# Patient Record
Sex: FEMALE | Race: WHITE | NOT HISPANIC OR LATINO | Employment: FULL TIME | URBAN - METROPOLITAN AREA
[De-identification: names, ages, dates, MRNs, and addresses within clinical notes are randomized per-mention and may not be internally consistent; named-entity substitution may affect disease eponyms.]

---

## 2021-12-17 ENCOUNTER — APPOINTMENT (OUTPATIENT)
Dept: CT IMAGING | Facility: CLINIC | Age: 56
End: 2021-12-17
Attending: EMERGENCY MEDICINE
Payer: COMMERCIAL

## 2021-12-17 ENCOUNTER — HOSPITAL ENCOUNTER (INPATIENT)
Facility: CLINIC | Age: 56
LOS: 3 days | Discharge: HOME | End: 2021-12-20
Attending: EMERGENCY MEDICINE | Admitting: INTERNAL MEDICINE
Payer: COMMERCIAL

## 2021-12-17 DIAGNOSIS — U07.1 PNEUMONIA DUE TO 2019 NOVEL CORONAVIRUS: ICD-10-CM

## 2021-12-17 DIAGNOSIS — J96.01 ACUTE RESPIRATORY FAILURE WITH HYPOXIA (H): ICD-10-CM

## 2021-12-17 DIAGNOSIS — J12.82 PNEUMONIA DUE TO 2019 NOVEL CORONAVIRUS: ICD-10-CM

## 2021-12-17 LAB
ALBUMIN SERPL-MCNC: 3.2 G/DL (ref 3.4–5)
ALP SERPL-CCNC: 68 U/L (ref 40–150)
ALT SERPL W P-5'-P-CCNC: 121 U/L (ref 0–50)
ANION GAP SERPL CALCULATED.3IONS-SCNC: 11 MMOL/L (ref 3–14)
AST SERPL W P-5'-P-CCNC: 47 U/L (ref 0–45)
BASOPHILS # BLD AUTO: 0 10E3/UL (ref 0–0.2)
BASOPHILS NFR BLD AUTO: 0 %
BILIRUB DIRECT SERPL-MCNC: 0.2 MG/DL (ref 0–0.2)
BILIRUB SERPL-MCNC: 0.6 MG/DL (ref 0.2–1.3)
BUN SERPL-MCNC: 15 MG/DL (ref 7–30)
CALCIUM SERPL-MCNC: 9 MG/DL (ref 8.5–10.1)
CHLORIDE BLD-SCNC: 94 MMOL/L (ref 94–109)
CO2 SERPL-SCNC: 28 MMOL/L (ref 20–32)
CREAT SERPL-MCNC: 0.54 MG/DL (ref 0.52–1.04)
CRP SERPL-MCNC: 66.3 MG/L (ref 0–8)
D DIMER PPP FEU-MCNC: 0.71 UG/ML FEU (ref 0–0.5)
EOSINOPHIL # BLD AUTO: 0 10E3/UL (ref 0–0.7)
EOSINOPHIL NFR BLD AUTO: 0 %
ERYTHROCYTE [DISTWIDTH] IN BLOOD BY AUTOMATED COUNT: 12.3 % (ref 10–15)
GFR SERPL CREATININE-BSD FRML MDRD: >90 ML/MIN/1.73M2
GLUCOSE BLD-MCNC: 123 MG/DL (ref 70–99)
HCT VFR BLD AUTO: 44.3 % (ref 35–47)
HGB BLD-MCNC: 14.6 G/DL (ref 11.7–15.7)
HOLD SPECIMEN: NORMAL
IMM GRANULOCYTES # BLD: 0 10E3/UL
IMM GRANULOCYTES NFR BLD: 1 %
LYMPHOCYTES # BLD AUTO: 0.7 10E3/UL (ref 0.8–5.3)
LYMPHOCYTES NFR BLD AUTO: 12 %
MCH RBC QN AUTO: 31.6 PG (ref 26.5–33)
MCHC RBC AUTO-ENTMCNC: 33 G/DL (ref 31.5–36.5)
MCV RBC AUTO: 96 FL (ref 78–100)
MONOCYTES # BLD AUTO: 0.7 10E3/UL (ref 0–1.3)
MONOCYTES NFR BLD AUTO: 11 %
NEUTROPHILS # BLD AUTO: 4.7 10E3/UL (ref 1.6–8.3)
NEUTROPHILS NFR BLD AUTO: 76 %
NRBC # BLD AUTO: 0 10E3/UL
NRBC BLD AUTO-RTO: 0 /100
PLATELET # BLD AUTO: 334 10E3/UL (ref 150–450)
POTASSIUM BLD-SCNC: 3.5 MMOL/L (ref 3.4–5.3)
PROT SERPL-MCNC: 8 G/DL (ref 6.8–8.8)
RBC # BLD AUTO: 4.62 10E6/UL (ref 3.8–5.2)
SODIUM SERPL-SCNC: 133 MMOL/L (ref 133–144)
TROPONIN I SERPL HS-MCNC: 24 NG/L
WBC # BLD AUTO: 6.1 10E3/UL (ref 4–11)

## 2021-12-17 PROCEDURE — 99207 PR CDG-HISTORY COMP: MEETS EXP. PROB FOCUSED- DOWN CODED LACK OF PFSH: CPT | Performed by: INTERNAL MEDICINE

## 2021-12-17 PROCEDURE — 93005 ELECTROCARDIOGRAM TRACING: CPT

## 2021-12-17 PROCEDURE — 36415 COLL VENOUS BLD VENIPUNCTURE: CPT | Performed by: EMERGENCY MEDICINE

## 2021-12-17 PROCEDURE — 86140 C-REACTIVE PROTEIN: CPT | Performed by: EMERGENCY MEDICINE

## 2021-12-17 PROCEDURE — 250N000009 HC RX 250: Performed by: EMERGENCY MEDICINE

## 2021-12-17 PROCEDURE — 85025 COMPLETE CBC W/AUTO DIFF WBC: CPT | Performed by: EMERGENCY MEDICINE

## 2021-12-17 PROCEDURE — 71275 CT ANGIOGRAPHY CHEST: CPT

## 2021-12-17 PROCEDURE — 96374 THER/PROPH/DIAG INJ IV PUSH: CPT

## 2021-12-17 PROCEDURE — 250N000011 HC RX IP 250 OP 636: Performed by: EMERGENCY MEDICINE

## 2021-12-17 PROCEDURE — 84484 ASSAY OF TROPONIN QUANT: CPT | Performed by: EMERGENCY MEDICINE

## 2021-12-17 PROCEDURE — 82248 BILIRUBIN DIRECT: CPT | Performed by: EMERGENCY MEDICINE

## 2021-12-17 PROCEDURE — 120N000001 HC R&B MED SURG/OB

## 2021-12-17 PROCEDURE — XW033E5 INTRODUCTION OF REMDESIVIR ANTI-INFECTIVE INTO PERIPHERAL VEIN, PERCUTANEOUS APPROACH, NEW TECHNOLOGY GROUP 5: ICD-10-PCS | Performed by: INTERNAL MEDICINE

## 2021-12-17 PROCEDURE — 250N000013 HC RX MED GY IP 250 OP 250 PS 637: Performed by: INTERNAL MEDICINE

## 2021-12-17 PROCEDURE — 99285 EMERGENCY DEPT VISIT HI MDM: CPT | Mod: 25

## 2021-12-17 PROCEDURE — 99221 1ST HOSP IP/OBS SF/LOW 40: CPT | Mod: AI | Performed by: INTERNAL MEDICINE

## 2021-12-17 PROCEDURE — C9803 HOPD COVID-19 SPEC COLLECT: HCPCS

## 2021-12-17 PROCEDURE — 85379 FIBRIN DEGRADATION QUANT: CPT | Performed by: EMERGENCY MEDICINE

## 2021-12-17 PROCEDURE — 80048 BASIC METABOLIC PNL TOTAL CA: CPT | Performed by: EMERGENCY MEDICINE

## 2021-12-17 RX ORDER — DEXAMETHASONE SODIUM PHOSPHATE 10 MG/ML
10 INJECTION, SOLUTION INTRAMUSCULAR; INTRAVENOUS ONCE
Status: COMPLETED | OUTPATIENT
Start: 2021-12-17 | End: 2021-12-17

## 2021-12-17 RX ORDER — BENZONATATE 100 MG/1
100 CAPSULE ORAL 3 TIMES DAILY PRN
Status: DISCONTINUED | OUTPATIENT
Start: 2021-12-17 | End: 2021-12-20 | Stop reason: HOSPADM

## 2021-12-17 RX ORDER — LIDOCAINE 40 MG/G
CREAM TOPICAL
Status: DISCONTINUED | OUTPATIENT
Start: 2021-12-17 | End: 2021-12-20 | Stop reason: HOSPADM

## 2021-12-17 RX ORDER — GUAIFENESIN/DEXTROMETHORPHAN 100-10MG/5
10 SYRUP ORAL EVERY 4 HOURS PRN
Status: DISCONTINUED | OUTPATIENT
Start: 2021-12-17 | End: 2021-12-20 | Stop reason: HOSPADM

## 2021-12-17 RX ORDER — AMOXICILLIN 250 MG
2 CAPSULE ORAL 2 TIMES DAILY PRN
Status: DISCONTINUED | OUTPATIENT
Start: 2021-12-17 | End: 2021-12-20 | Stop reason: HOSPADM

## 2021-12-17 RX ORDER — ACETAMINOPHEN 650 MG/1
650 SUPPOSITORY RECTAL EVERY 6 HOURS PRN
Status: DISCONTINUED | OUTPATIENT
Start: 2021-12-17 | End: 2021-12-20 | Stop reason: HOSPADM

## 2021-12-17 RX ORDER — IOPAMIDOL 755 MG/ML
53 INJECTION, SOLUTION INTRAVASCULAR ONCE
Status: COMPLETED | OUTPATIENT
Start: 2021-12-17 | End: 2021-12-17

## 2021-12-17 RX ORDER — FAMOTIDINE 20 MG/1
20 TABLET, FILM COATED ORAL 2 TIMES DAILY
Status: DISCONTINUED | OUTPATIENT
Start: 2021-12-17 | End: 2021-12-20 | Stop reason: HOSPADM

## 2021-12-17 RX ORDER — ONDANSETRON 4 MG/1
4 TABLET, ORALLY DISINTEGRATING ORAL EVERY 6 HOURS PRN
Status: DISCONTINUED | OUTPATIENT
Start: 2021-12-17 | End: 2021-12-20 | Stop reason: HOSPADM

## 2021-12-17 RX ORDER — AMOXICILLIN 250 MG
1 CAPSULE ORAL 2 TIMES DAILY PRN
Status: DISCONTINUED | OUTPATIENT
Start: 2021-12-17 | End: 2021-12-20 | Stop reason: HOSPADM

## 2021-12-17 RX ORDER — ONDANSETRON 2 MG/ML
4 INJECTION INTRAMUSCULAR; INTRAVENOUS EVERY 6 HOURS PRN
Status: DISCONTINUED | OUTPATIENT
Start: 2021-12-17 | End: 2021-12-20 | Stop reason: HOSPADM

## 2021-12-17 RX ORDER — ACETAMINOPHEN 325 MG/1
650 TABLET ORAL EVERY 6 HOURS PRN
Status: DISCONTINUED | OUTPATIENT
Start: 2021-12-17 | End: 2021-12-20 | Stop reason: HOSPADM

## 2021-12-17 RX ORDER — NAPROXEN 500 MG/1
500 TABLET ORAL EVERY 12 HOURS PRN
Status: DISCONTINUED | OUTPATIENT
Start: 2021-12-17 | End: 2021-12-20 | Stop reason: HOSPADM

## 2021-12-17 RX ADMIN — SODIUM CHLORIDE 77 ML: 9 INJECTION, SOLUTION INTRAVENOUS at 20:13

## 2021-12-17 RX ADMIN — GUAIFENESIN AND DEXTROMETHORPHAN 10 ML: 100; 10 SYRUP ORAL at 23:53

## 2021-12-17 RX ADMIN — FAMOTIDINE 20 MG: 20 TABLET ORAL at 23:52

## 2021-12-17 RX ADMIN — DEXAMETHASONE SODIUM PHOSPHATE 10 MG: 10 INJECTION INTRAMUSCULAR; INTRAVENOUS at 21:47

## 2021-12-17 RX ADMIN — IOPAMIDOL 53 ML: 755 INJECTION, SOLUTION INTRAVENOUS at 20:13

## 2021-12-17 ASSESSMENT — ENCOUNTER SYMPTOMS
SHORTNESS OF BREATH: 1
NAUSEA: 0
VOMITING: 0
COUGH: 1
DIARRHEA: 1
CHILLS: 1
APPETITE CHANGE: 1
FEVER: 1

## 2021-12-17 ASSESSMENT — MIFFLIN-ST. JEOR: SCORE: 1108.43

## 2021-12-17 ASSESSMENT — ACTIVITIES OF DAILY LIVING (ADL)
ADLS_ACUITY_SCORE: 12
ADLS_ACUITY_SCORE: 12

## 2021-12-17 NOTE — ED TRIAGE NOTES
Pt tested + for COVID 10 days ago. She reports she was seen in clinic on Wednesday for continuing SOB and diagnosed w/ pneumonia and given a z-pack and prednisone. Pt has ongoing SOB. ABCs intact.

## 2021-12-18 LAB
ALBUMIN SERPL-MCNC: 3 G/DL (ref 3.4–5)
ALP SERPL-CCNC: 67 U/L (ref 40–150)
ALT SERPL W P-5'-P-CCNC: 107 U/L (ref 0–50)
ANION GAP SERPL CALCULATED.3IONS-SCNC: 9 MMOL/L (ref 3–14)
AST SERPL W P-5'-P-CCNC: 35 U/L (ref 0–45)
BILIRUB SERPL-MCNC: 0.6 MG/DL (ref 0.2–1.3)
BUN SERPL-MCNC: 18 MG/DL (ref 7–30)
CALCIUM SERPL-MCNC: 8.9 MG/DL (ref 8.5–10.1)
CHLORIDE BLD-SCNC: 97 MMOL/L (ref 94–109)
CO2 SERPL-SCNC: 27 MMOL/L (ref 20–32)
CREAT SERPL-MCNC: 0.59 MG/DL (ref 0.52–1.04)
CRP SERPL-MCNC: 49.5 MG/L (ref 0–8)
D DIMER PPP FEU-MCNC: 0.44 UG/ML FEU (ref 0–0.5)
GFR SERPL CREATININE-BSD FRML MDRD: >90 ML/MIN/1.73M2
GLUCOSE BLD-MCNC: 183 MG/DL (ref 70–99)
POTASSIUM BLD-SCNC: 3.7 MMOL/L (ref 3.4–5.3)
PROT SERPL-MCNC: 7.3 G/DL (ref 6.8–8.8)
SODIUM SERPL-SCNC: 133 MMOL/L (ref 133–144)

## 2021-12-18 PROCEDURE — 80053 COMPREHEN METABOLIC PANEL: CPT | Performed by: INTERNAL MEDICINE

## 2021-12-18 PROCEDURE — 36415 COLL VENOUS BLD VENIPUNCTURE: CPT | Performed by: INTERNAL MEDICINE

## 2021-12-18 PROCEDURE — 250N000011 HC RX IP 250 OP 636: Performed by: INTERNAL MEDICINE

## 2021-12-18 PROCEDURE — 85379 FIBRIN DEGRADATION QUANT: CPT | Performed by: INTERNAL MEDICINE

## 2021-12-18 PROCEDURE — 120N000001 HC R&B MED SURG/OB

## 2021-12-18 PROCEDURE — 86140 C-REACTIVE PROTEIN: CPT | Performed by: INTERNAL MEDICINE

## 2021-12-18 PROCEDURE — 99233 SBSQ HOSP IP/OBS HIGH 50: CPT | Performed by: HOSPITALIST

## 2021-12-18 PROCEDURE — 250N000012 HC RX MED GY IP 250 OP 636 PS 637: Performed by: INTERNAL MEDICINE

## 2021-12-18 PROCEDURE — 250N000013 HC RX MED GY IP 250 OP 250 PS 637: Performed by: INTERNAL MEDICINE

## 2021-12-18 RX ORDER — LOPERAMIDE HCL 2 MG
2 CAPSULE ORAL 4 TIMES DAILY PRN
Status: DISCONTINUED | OUTPATIENT
Start: 2021-12-18 | End: 2021-12-20 | Stop reason: HOSPADM

## 2021-12-18 RX ORDER — ALBUTEROL SULFATE 90 UG/1
1-2 AEROSOL, METERED RESPIRATORY (INHALATION) EVERY 4 HOURS PRN
COMMUNITY
Start: 2021-12-15

## 2021-12-18 RX ORDER — ALBUTEROL SULFATE 90 UG/1
2 AEROSOL, METERED RESPIRATORY (INHALATION) EVERY 6 HOURS PRN
Status: DISCONTINUED | OUTPATIENT
Start: 2021-12-18 | End: 2021-12-20 | Stop reason: HOSPADM

## 2021-12-18 RX ORDER — AZITHROMYCIN 250 MG/1
TABLET, FILM COATED ORAL
Status: ON HOLD | COMMUNITY
Start: 2021-12-15 | End: 2021-12-20

## 2021-12-18 RX ORDER — PREDNISONE 10 MG/1
TABLET ORAL
Status: ON HOLD | COMMUNITY
Start: 2021-12-15 | End: 2021-12-20

## 2021-12-18 RX ADMIN — ACETAMINOPHEN 650 MG: 325 TABLET, FILM COATED ORAL at 09:15

## 2021-12-18 RX ADMIN — FAMOTIDINE 20 MG: 20 TABLET ORAL at 09:15

## 2021-12-18 RX ADMIN — DEXAMETHASONE 6 MG: 2 TABLET ORAL at 09:15

## 2021-12-18 RX ADMIN — FAMOTIDINE 20 MG: 20 TABLET ORAL at 20:19

## 2021-12-18 RX ADMIN — GUAIFENESIN AND DEXTROMETHORPHAN 10 ML: 100; 10 SYRUP ORAL at 20:19

## 2021-12-18 RX ADMIN — Medication 1 MG: at 22:32

## 2021-12-18 RX ADMIN — ENOXAPARIN SODIUM 40 MG: 40 INJECTION SUBCUTANEOUS at 22:32

## 2021-12-18 RX ADMIN — GUAIFENESIN AND DEXTROMETHORPHAN 10 ML: 100; 10 SYRUP ORAL at 14:00

## 2021-12-18 RX ADMIN — GUAIFENESIN AND DEXTROMETHORPHAN 10 ML: 100; 10 SYRUP ORAL at 09:17

## 2021-12-18 RX ADMIN — ENOXAPARIN SODIUM 40 MG: 40 INJECTION SUBCUTANEOUS at 00:50

## 2021-12-18 ASSESSMENT — ACTIVITIES OF DAILY LIVING (ADL)
ADLS_ACUITY_SCORE: 4

## 2021-12-18 NOTE — PLAN OF CARE
Pt arrived to the 6th floor around 2300. Pt is alert and oriented. Denies of having any pain. LS clear but diminished in bases. Small infrequent cough with productive thin/thick yellow sputum. Pt has lost 10lbs in the 10 days that she has been suffered from this illness. GI - having difficulty making it into the bathroom - bedside commode provided. LBM 12/18/21 - loose/watery brown/yellow stool. On decadron and lovenox.

## 2021-12-18 NOTE — PROVIDER NOTIFICATION
Spoke w/ pt about remdesivir as per MD, the patient would be a candidate for it. Pt declined, RN let MD know.

## 2021-12-18 NOTE — PHARMACY-ADMISSION MEDICATION HISTORY
Admission medication history interview status for this patient is complete. See [facility] admission navigator for allergy information, prior to admission medications and immunization status.     Medication history interview done, indicate source(s): Patient  Medication history resources (including written lists, pill bottles, clinic record):SureScripts, Care Everywhere  Pharmacy: [facility] [id number] [place] [facility] [id number] [facility]    Changes made to PTA medication list:  Added: azithromycin, prednisone  Changed: none  Reported as Not Taking: none  Removed: Percocet, sertraline (old entries)    Actions taken by pharmacist (provider contacted, etc):left sticky note for provider     Additional medication history information:None    Medication reconciliation/reorder completed by provider prior to medication history?  N    Prior to Admission medications    Medication Sig Last Dose Taking? Auth Provider   albuterol (PROAIR HFA/PROVENTIL HFA/VENTOLIN HFA) 108 (90 Base) MCG/ACT inhaler Inhale 1-2 puffs into the lungs every 4 hours as needed 12/17/2021 Yes Unknown, Entered By History   azithromycin (ZITHROMAX) 250 MG tablet Take 500 mg (2 tabs) by mouth on day 1, then 250 mg (1 tab) daily for days 2-5. 12/17/2021 at AM, 250 mg Yes Unknown, Entered By History   predniSONE (DELTASONE) 10 MG tablet Take 3 tabs daily for 3 days, then take 2 tabs daily for 2 days, then 1 tab daily for 2 days; avoid taking at night 12/17/2021 at 3 tabs Yes Unknown, Entered By History

## 2021-12-18 NOTE — PLAN OF CARE
Pt A&O x4. VS stable; afebrile. Weaned to 1L O2. Lung sounds: diminished-crackles in the bases. Robitussin given for productive cough. PO tylenol managing pain. CMS intact. Up independently in room. Voiding in good amts. Still having loose stools-but has slowed down per pt. Tolerating regular diet. Plan is home @ discharge. Will continue to monitor.

## 2021-12-18 NOTE — PROGRESS NOTES
[facility]    Hospitalist Progress Note    Date of Service (when I saw the patient): 12/18/2021  Provider:  Agustín Colin MD   Text Page  7am - 6PM       Assessment & Plan   Polly Whelan is a 56 year old female who is generally healthy but not vaccinated against COVID-19 who presents with shortness of breath in the setting of recent diagnosis of COVID-19.  She was diagnosed about 10 days ago and initially she had typical symptoms including fever, chills, cough and some loose stools.  Most of her symptoms were getting better aside from her dyspnea and cough and now even walking across the room leaves her quite short of breath.  She has had generally poor appetite but has been able to drink fluids. She had been on prednisone 30 mg daily for the 3 days preceding admission, in addition to azithromycin started as an outpatient.   In  the emergency department she was mildly hypoxemic requiring 2 L supplemental oxygen. CT PE protocol negative for PE, shows bilateral GGO / infiltrates.     Assessment and Plan:   1. Acute hypoxemic respiratory failure due to COVID-19:   --inpatient status  --Unvaccinated status  --Symptoms came on somewhere around 12/5  --Positive test reportedly approximately 12/7/2021  --Decadron 6 mg daily 2/10 days  -- LFTs allow to use remdesivir but she has declined.  --Lovenox 40 mg daily for DVT prophylaxis  -- CRP and D-dimer  --Tylenol, Robitussin, naproxen, Tessalon etc.  --Pepcid for GI prophylaxis while on steroids  2. Diarrhea 2/2 to Covid.   -- Imodium for diarrhea     DVT Prophylaxis: Enoxaparin (Lovenox) SQ  Code Status: Full Code    Disposition: Expected discharge in 2 - 3 days if wean off or low O2 necessity. .    Interval History   She is having diarrhea, no fever, nausea or vomiting.  Her appetite is dissectible.  Breathing is no worse than yesterday.  She still needs oxygen to keep saturation, desats when she is moving around but recovers quickly.    -Data  reviewed today: I reviewed all new labs and imaging results over the last 24 hours.    Physical Exam   Temp: 97.6  F (36.4  C) Temp src: Temporal BP: 132/82 Pulse: 67   Resp: 18 SpO2: 94 % O2 Device: Nasal cannula Oxygen Delivery: 3 LPM  Vitals:    12/17/21 1339 12/17/21 2303   Weight: 56.5 kg (124 lb 9 oz) 56.5 kg (124 lb 9.6 oz)     Vital Signs with Ranges  Temp:  [97  F (36.1  C)-98.2  F (36.8  C)] 97.6  F (36.4  C)  Pulse:  [67-81] 67  Resp:  [18-32] 18  BP: (123-153)/() 132/82  SpO2:  [87 %-96 %] 94 %  No intake/output data recorded.    GEN:  Alert, oriented x 3, appears comfortable, NAD.  HEENT:  Normocephalic/atraumatic, no scleral icterus, no nasal discharge, mouth moist.  CV:  Regular rate and rhythm, no murmur or JVD.  S1 + S2 noted, no S3 or S4.  LUNGS:  Clear to auscultation bilaterally without rales/rhonchi/wheezing/retractions.  Symmetric chest rise on inhalation noted.  ABD:  Active bowel sounds, soft, non-tender/non-distended.  No rebound/guarding/rigidity.  EXT:  No edema or cyanosis.  No joint synovitis noted.  SKIN:  Dry to touch, no exanthems noted in the visualized areas.       Medications       dexamethasone  6 mg Oral Daily     enoxaparin ANTICOAGULANT  40 mg Subcutaneous Q24H     famotidine  20 mg Oral BID     sodium chloride (PF)  3 mL Intracatheter Q8H       Data   Recent Labs   Lab 12/18/21  0752 12/17/21  1700   WBC  --  6.1   HGB  --  14.6   MCV  --  96   PLT  --  334    133   POTASSIUM 3.7 3.5   CHLORIDE 97 94   CO2 27 28   BUN 18 15   CR 0.59 0.54   ANIONGAP 9 11   JAIRO 8.9 9.0   * 123*   ALBUMIN 3.0* 3.2*   PROTTOTAL 7.3 8.0   BILITOTAL 0.6 0.6   ALKPHOS 67 68   * 121*   AST 35 47*       Recent Results (from the past 24 hour(s))   CT Chest Pulmonary Embolism w Contrast    Narrative    EXAM: CT CHEST PULMONARY EMBOLISM W CONTRAST  LOCATION: [facility]  DATE/TIME: 12/17/2021 8:11 PM    INDICATION: PE suspected, low/intermediate prob,  positive d-dimer, shortness of breath, fever, chills, cough, COVID positive  COMPARISON: 03/22/2004  TECHNIQUE: CT chest pulmonary angiogram during arterial phase injection of IV contrast. Multiplanar reformats and MIP reconstructions were performed. Dose reduction techniques were used.   CONTRAST: 53 mL Isovue-370    FINDINGS:  ANGIOGRAM CHEST: Pulmonary arteries are normal caliber and negative for pulmonary emboli. Thoracic aorta is negative for dissection. No CT evidence of right heart strain.    LUNGS AND PLEURA: At least moderate areas of developing consolidation and groundglass opacity throughout both hemithoraces with a basilar predominance. No effusion.    MEDIASTINUM/AXILLAE: Normal.    CORONARY ARTERY CALCIFICATION: None.    UPPER ABDOMEN: 6.0 x 5.7 cm probable left adrenal gland adenoma considerably increased in size when compared to prior.    MUSCULOSKELETAL: Degenerative disease.      Impression    IMPRESSION:  1.  At least moderate areas of groundglass opacity and developing consolidation consistent with patient's known COVID pneumonia. Follow-up recommended in 3 months to ensure resolution and exclude other underlying pathology.  2.  Increase in size of patient's presumed left adrenal gland adenoma.  3.  No pulmonary embolus, aortic aneurysm, or dissection.    Commonly reported imaging features of (COVID-19) pneumonia are present. Influenza pneumonia and organizing pneumonia as can be seen in the setting of drug toxicity and connective tissue disease can cause a similar imaging pattern.       Disclaimer: This note consists of symbols derived from keyboarding, dictation and/or voice recognition software. As a result, there may be errors in the script that have gone undetected. Please consider this when interpreting information found in this chart.

## 2021-12-18 NOTE — ED NOTES
[facility]  ED Nurse Handoff Report    Polly Whelan is a 56 year old female   ED Chief complaint: Shortness of Breath  . ED Diagnosis:   Final diagnoses:   Acute respiratory failure with hypoxia (H)   Pneumonia due to 2019 novel coronavirus     Allergies:   Allergies   Allergen Reactions     Penicillins      Sulfa Drugs        Code Status: Full Code  Activity level - Baseline/Home:  Independent. Activity Level - Current:   Stand by Assist. Lift room needed: No. Bariatric: No   Needed: No   Isolation: Yes. Infection: Not Applicable  COVID r/o and special precautions.     Vital Signs:   Vitals:    12/17/21 1803 12/17/21 1830 12/17/21 1930 12/17/21 2000   BP: (!) 150/105 (!) 130/90 (!) 123/90 (!) 125/96   Pulse: 76 72 81 79   Resp:       Temp:       TempSrc:       SpO2: 90% 94% 96% 96%   Weight:           Cardiac Rhythm:  ,      Pain level:    Patient confused: No. Patient Falls Risk: No.   Elimination Status: Has voided   Patient Report - Initial Complaint: Shortness of Breath. Focused Assessment:   Pt presents after being diagnosed with COVID 10 days ago. Pt with increased shortness of breath, now requiring 2L N/C. Pt is A&O x4, GCS-15.   Tests Performed:   Labs Ordered and Resulted from Time of ED Arrival to Time of ED Departure   BASIC METABOLIC PANEL - Abnormal       Result Value    Sodium 133      Potassium 3.5      Chloride 94      Carbon Dioxide (CO2) 28      Anion Gap 11      Urea Nitrogen 15      Creatinine 0.54      Calcium 9.0      Glucose 123 (*)     GFR Estimate >90     CBC WITH PLATELETS AND DIFFERENTIAL - Abnormal    WBC Count 6.1      RBC Count 4.62      Hemoglobin 14.6      Hematocrit 44.3      MCV 96      MCH 31.6      MCHC 33.0      RDW 12.3      Platelet Count 334      % Neutrophils 76      % Lymphocytes 12      % Monocytes 11      % Eosinophils 0      % Basophils 0      % Immature Granulocytes 1      NRBCs per 100 WBC 0      Absolute Neutrophils 4.7      Absolute  Lymphocytes 0.7 (*)     Absolute Monocytes 0.7      Absolute Eosinophils 0.0      Absolute Basophils 0.0      Absolute Immature Granulocytes 0.0      Absolute NRBCs 0.0     HEPATIC FUNCTION PANEL - Abnormal    Bilirubin Total 0.6      Bilirubin Direct 0.2      Protein Total 8.0      Albumin 3.2 (*)     Alkaline Phosphatase 68      AST 47 (*)      (*)    D DIMER QUANTITATIVE - Abnormal    D-Dimer Quantitative 0.71 (*)    CRP INFLAMMATION - Abnormal    CRP Inflammation 66.3 (*)    TROPONIN I - Normal    Troponin I High Sensitivity 24       Labs Ordered and Resulted from Time of ED Arrival to Time of ED Departure   BASIC METABOLIC PANEL - Abnormal       Result Value    Sodium 133      Potassium 3.5      Chloride 94      Carbon Dioxide (CO2) 28      Anion Gap 11      Urea Nitrogen 15      Creatinine 0.54      Calcium 9.0      Glucose 123 (*)     GFR Estimate >90     CBC WITH PLATELETS AND DIFFERENTIAL - Abnormal    WBC Count 6.1      RBC Count 4.62      Hemoglobin 14.6      Hematocrit 44.3      MCV 96      MCH 31.6      MCHC 33.0      RDW 12.3      Platelet Count 334      % Neutrophils 76      % Lymphocytes 12      % Monocytes 11      % Eosinophils 0      % Basophils 0      % Immature Granulocytes 1      NRBCs per 100 WBC 0      Absolute Neutrophils 4.7      Absolute Lymphocytes 0.7 (*)     Absolute Monocytes 0.7      Absolute Eosinophils 0.0      Absolute Basophils 0.0      Absolute Immature Granulocytes 0.0      Absolute NRBCs 0.0     HEPATIC FUNCTION PANEL - Abnormal    Bilirubin Total 0.6      Bilirubin Direct 0.2      Protein Total 8.0      Albumin 3.2 (*)     Alkaline Phosphatase 68      AST 47 (*)      (*)    D DIMER QUANTITATIVE - Abnormal    D-Dimer Quantitative 0.71 (*)    CRP INFLAMMATION - Abnormal    CRP Inflammation 66.3 (*)    TROPONIN I - Normal    Troponin I High Sensitivity 24         . Abnormal Results: ..   Treatments provided: IV Decadron  Family Comments: Pt in contact with  family.  OBS brochure/video discussed/provided to patient:  No  ED Medications:   Medications   dexamethasone PF (DECADRON) injection 10 mg (has no administration in time range)   iopamidol (ISOVUE-370) solution 53 mL (53 mLs Intravenous Given 12/17/21 2013)   sodium chloride 0.9 % bag 500mL for CT scan flush use (77 mLs Intravenous Given 12/17/21 2013)     Drips infusing:  No  For the majority of the shift, the patient's behavior Green. Interventions performed were Call light within reach, pt using call light appropriately.    Sepsis treatment initiated: No     Patient tested for COVID 19 prior to admission: No, pt known positive.     ED Nurse Name/Phone Number: Richie Oakes RN,   9:43 PM  RECEIVING UNIT ED HANDOFF REVIEW    Above ED Nurse Handoff Report was reviewed: Yes  Reviewed by: Izabella Castanon RN on December 17, 2021 at 10:41 PM

## 2021-12-18 NOTE — CONSULTS
"CLINICAL NUTRITION SERVICES  -  ASSESSMENT NOTE      MALNUTRITION:  % Weight Loss:  > 5% in 1 month (severe malnutrition)  % Intake:  <75% for > 7 days (moderate malnutrition)  Subcutaneous Fat Loss:  Unable to complete   Muscle Loss: Unable to complete  Fluid Retention: None documented    Malnutrition Diagnosis: Moderate malnutrition  In Context of:  Acute illness or injury        REASON FOR ASSESSMENT  Polly Whelan is a 56 year old female seen by Registered Dietitian for Admission Nutrition Risk Screen for positive and RN Consult - \"pt reported poor intake, weight loss\".    PMH of: Unvaccinated against COVID.    Admit 2/2: Acute respiratory failure, COVID+.    NUTRITION HISTORY  - Information obtained from patient via phone and chart.  - Diet at home: Regular.  - Barriers to PO intakes: 10 day period of eating less than usual d/t loss of taste/smell, decreased appetite, and diarrhea.  Consistently eating less than usual during this time per report.  - Use of oral supplements: None.  - Allergies: NKFA.      CURRENT NUTRITION ORDERS  Diet Order:     Regular    Current Intake/Tolerance:  Limited timeframe since admit.  Feels her appetite is slowly improving and notes she can now taste/smell again.      NUTRITION FOCUSED PHYSICAL ASSESSMENT FOR DIAGNOSING MALNUTRITION)  No    Obtained from Chart/Interdisciplinary Team:  - Currently requiring 3 L NC  - No documentation of PI  - Stooling patterns reviewed    ANTHROPOMETRICS  Height: 5' 2\"  Weight: 124 lbs 9.6 oz  Body mass index is 22.79 kg/m .  Weight Status:  Normal BMI  Weight History:  Wt Readings from Last 10 Encounters:   12/17/21 56.5 kg (124 lb 9.6 oz)     - Wt of 134# from 12/15/2021.  - Wt of 130# from 11/06/202.  - Patient herself confirms wt loss.  Notes she was weighing closer to 134# prior to the loss and feels like she lost w/in that 10 day period.  - Indicates at least 5% wt loss <1 month.      LABS  Labs reviewed:  Electrolytes  Potassium (mmol/L) "   Date Value   12/18/2021 3.7   12/17/2021 3.5   08/15/2010 3.9    Blood Glucose  Glucose (mg/dL)   Date Value   12/17/2021 123 (H)   08/15/2010 93    Inflammatory Markers  CRP Inflammation (mg/L)   Date Value   12/17/2021 66.3 (H)     WBC (10e9/L)   Date Value   08/15/2010 11.4 (H)     WBC Count (10e3/uL)   Date Value   12/17/2021 6.1     Albumin (g/dL)   Date Value   12/17/2021 3.2 (L)   08/15/2010 4.2      Sodium (mmol/L)   Date Value   12/18/2021 133   12/17/2021 133   08/15/2010 135    Renal  Urea Nitrogen (mg/dL)   Date Value   12/17/2021 15   08/15/2010 11     Creatinine (mg/dL)   Date Value   12/17/2021 0.54   08/15/2010 0.62     Additional  Ketones Urine (mg/dL)   Date Value   08/15/2010 5 (A)        B/P: 132/82, T: 97.6, P: 67, R: 18      MEDICATIONS  Medications reviewed:    dexamethasone  6 mg Oral Daily     enoxaparin ANTICOAGULANT  40 mg Subcutaneous Q24H     famotidine  20 mg Oral BID     sodium chloride (PF)  3 mL Intracatheter Q8H             ASSESSED NUTRITION NEEDS PER APPROVED PRACTICE GUIDELINES:    Dosing Weight 57 kg   Estimated Energy Needs: 25-30+ Kcal/Kg  Justification: minimum maintenance  Estimated Protein Needs: >/=1.2 g pro/Kg  Justification: preservation of lean body mass  Estimated Fluid Needs: per MD      NUTRITION DIAGNOSIS:  Inadequate oral intake related to decreased appetite, loss of taste/smell and diarrhea associated with COVID as evidenced by meeting <75% needs for a period of 10 days PTA w/ severe wt loss during this time, malnutrition coding.     NUTRITION INTERVENTIONS  Recommendations / Nutrition Prescription  Diet per MD.  Encouraged high calorie/high protein self-selection as able, small/frequent meals as needed.  Consistency in PO intakes encouraged.    Declined oral supplement offering.        Implementation  Nutrition education: Provided education on above.    Nutrition Goals  Patient to consume at least 75% of meals TID while admitted.        MONITORING AND  EVALUATION:  Progress towards goals will be monitored and evaluated per protocol and Practice Guidelines          Gissell Vásquez RDN, LD  Clinical Dietitian  3rd floor/ICU: [phone]  All other floors: [phone]  Weekend/holiday: [phone]

## 2021-12-18 NOTE — ED PROVIDER NOTES
History   Chief Complaint:  Shortness of Breath       HPI   Polly Whelan is a 56 year old female with no known medical problems who presents with shortness of breath in the setting of known COVID-19.  She was diagnosed 10 days ago.  Her symptoms consist of initial fever and chills with cough that was dry and shortness of breath.  She notes her symptoms seem to get better aside from the shortness of breath and cough.  She notes she has trouble walking across the room without becoming significantly short of breath.  She is also had watery diarrhea 3-4 episodes daily.  She denies nausea or vomiting.  She is not been eating much but has been drinking fluids.  She denies chest pain.  She denies leg swelling or pain.  She has not been vaccinated.    XR Chest 2 Views PA and Lateral 12/15/21  Small patchy subpleural infiltrates in both mid and lower   lungs, slightly more extensive on the left compatible with COVID   pneumonia. No pleural effusions. Heart size and pulmonary vascularity   Normal.    D-Dimer 12/15/21  0.76(H)    Review of Systems   Constitutional: Positive for appetite change, chills and fever.   Respiratory: Positive for cough and shortness of breath.    Cardiovascular: Negative for chest pain and leg swelling (no pain).   Gastrointestinal: Positive for diarrhea. Negative for nausea and vomiting.   All other systems reviewed and are negative.      Allergies:  Penicillins  Sulfa Drugs    Medications:  Albuterol  Prednisone  Azithromycin     Past Medical History:     COVID-19  Anxiety and depression    Past Surgical History:    Hysterectomy, partial  Thyroidectomy, partial  Tonsillectomy    Family History:    Brother: Alcohol/drug, psychiatric illness  Father: Diabetes, hypertension, kidney failure, thyroid disease  Mother: Alcohol/drug, lung cancer, osteoporosis   Sister: Suicide attempt    Social History:  The patient is alone in the ED.  Marital Status:   Tobacco Use: Negative    Physical Exam      Patient Vitals for the past 24 hrs:   BP Temp Temp src Pulse Resp SpO2 Weight   12/17/21 2000 (!) 125/96 -- -- 79 -- 96 % --   12/17/21 1930 (!) 123/90 -- -- 81 -- 96 % --   12/17/21 1830 (!) 130/90 -- -- 72 -- 94 % --   12/17/21 1803 (!) 150/105 -- -- 76 -- 90 % --   12/17/21 1802 (!) 150/105 -- -- 75 18 (!) 87 % --   12/17/21 1339 -- -- -- -- -- -- 56.5 kg (124 lb 9 oz)   12/17/21 1337 (!) 153/104 98.2  F (36.8  C) Temporal 81 18 92 % --       Physical Exam  General: Adult female sitting upright  Eyes: PERRL, Conjunctive within normal limits  ENT: Moist mucous membranes, oropharynx clear.   CV: Normal S1S2, no murmur, rub or gallop. Regular rate and rhythm  Resp: Clear to auscultation bilaterally, no wheezes, rales or rhonchi. Normal respiratory effort.  GI: Abdomen is soft, nontender and nondistended. No palpable masses. No rebound or guarding.  MSK: No edema. Nontender. Normal active range of motion.  Skin: Warm and dry. No rashes or lesions or ecchymoses on visible skin.  Neuro: Alert and oriented. Responds appropriately to all questions and commands. No focal findings appreciated. Normal muscle tone.  Psych: Normal mood and affect. Pleasant.    Emergency Department Course   ECG  ECG obtained at 1818, ECG read at 1822  Normal sinus rhythm  Normal ECG   Rate 68 bpm. ID interval 144 ms. QRS duration 80 ms. QT/QTc 420/446 ms. P-R-T axes 33 2 22.     Imaging:    CT Chest Pulmonary Embolism w Contrast   Preliminary Result   IMPRESSION:   1.  At least moderate areas of groundglass opacity and developing consolidation consistent with patient's known COVID pneumonia. Follow-up recommended in 3 months to ensure resolution and exclude other underlying pathology.   2.  Increase in size of patient's presumed left adrenal gland adenoma.   3.  No pulmonary embolus, aortic aneurysm, or dissection.      Commonly reported imaging features of (COVID-19) pneumonia are present. Influenza pneumonia and organizing pneumonia as  can be seen in the setting of drug toxicity and connective tissue disease can cause a similar imaging pattern.        The above imaging workup was performed.   Report per radiology    Laboratory:  Labs Ordered and Resulted from Time of ED Arrival to Time of ED Departure   BASIC METABOLIC PANEL - Abnormal       Result Value    Sodium 133      Potassium 3.5      Chloride 94      Carbon Dioxide (CO2) 28      Anion Gap 11      Urea Nitrogen 15      Creatinine 0.54      Calcium 9.0      Glucose 123 (*)     GFR Estimate >90     CBC WITH PLATELETS AND DIFFERENTIAL - Abnormal    WBC Count 6.1      RBC Count 4.62      Hemoglobin 14.6      Hematocrit 44.3      MCV 96      MCH 31.6      MCHC 33.0      RDW 12.3      Platelet Count 334      % Neutrophils 76      % Lymphocytes 12      % Monocytes 11      % Eosinophils 0      % Basophils 0      % Immature Granulocytes 1      NRBCs per 100 WBC 0      Absolute Neutrophils 4.7      Absolute Lymphocytes 0.7 (*)     Absolute Monocytes 0.7      Absolute Eosinophils 0.0      Absolute Basophils 0.0      Absolute Immature Granulocytes 0.0      Absolute NRBCs 0.0     HEPATIC FUNCTION PANEL - Abnormal    Bilirubin Total 0.6      Bilirubin Direct 0.2      Protein Total 8.0      Albumin 3.2 (*)     Alkaline Phosphatase 68      AST 47 (*)      (*)    D DIMER QUANTITATIVE - Abnormal    D-Dimer Quantitative 0.71 (*)    CRP INFLAMMATION - Abnormal    CRP Inflammation 66.3 (*)    TROPONIN I - Normal    Troponin I High Sensitivity 24        Emergency Department Course:    Reviewed:  I reviewed nursing notes, vitals, Care Everywhere, and past medical history    Assessments:  1804 I obtained history and examined the patient as noted above.   2157 I rechecked the patient and explained findings.  She denies any new concerns.  She reports feeling comfortable at this time.    Consults:  2106 I spoke with Dr. Wilkins of the hospitalist service from [facility] regarding patient's  presentation, findings, and plan of care.    Interventions:  Decadron, 10 mg, IV    Disposition:  The patient was admitted to the hospital under the care of Dr. Wilkins.     Impression & Plan       Medical Decision Making:  Polly Whelan is a 56 year old female with a diagnosis of COVID-19 10 days ago who presents emergency department with concerns for shortness of breath, diarrhea, poor oral intake and ongoing cough.  She was hypoxic, primarily with exertion, here in the ED.  Vital signs were significant for tachypnea.  She was managed on 2 L oxygen via nasal cannula.  She is not having chest pain but did have an elevated D-dimer prehospital.  CT scan did not show pulmonary embolism.  She has evidence of Covid pneumonia.  Decadron was administered.  Further therapies as indicated while admitted.  At this time she is stable.  She understands the recommendation for admission and is agreeable to this plan.  All questions were answered.    Diagnosis:    ICD-10-CM    1. Acute respiratory failure with hypoxia (H)  J96.01    2. Pneumonia due to 2019 novel coronavirus  U07.1     J12.82      Scribe Disclosure:  Anirudh ROGEL, am serving as a scribe at 6:01 PM on 12/17/2021 to document services personally performed by Hedy Villavicencio MD based on my observations and the provider's statements to me.          Hedy Villavicencio MD  12/18/21 0200

## 2021-12-18 NOTE — H&P
[facility]  Hospitalist Admission Note  Name: Polly Whelan    MRN: [id number]  YOB: 1965    Age: 56 year old  Date of admission: 12/17/2021  Primary care provider: System, Provider Not In    Chief Complaint: COVID-19    Polly Whelan is a 56 year old female who is generally healthy but not vaccinated against COVID-19 who presents with shortness of breath in the setting of recent diagnosis of COVID-19.  She was diagnosed about 10 days ago and initially she had typical symptoms including fever, chills, cough and some loose stools.  Most of her symptoms were getting better aside from her dyspnea and cough and now even walking across the room leaves her quite short of breath.  She has had generally poor appetite but has been able to drink fluids.    Notably, she has been on prednisone 30 mg daily for the past 3 days in addition to azithromycin which was started as an outpatient.    Here in the emergency room she was mildly hypoxic requiring 2 L supplemental oxygen.  Lab work-up was relatively unremarkable including normal BMP but LFTs were mildly abnormal with ALT of 121 and AST of 47.  D-dimer was elevated at 0.71 and this was followed by CT PE protocol which is pending but does show bilateral infiltrates.      Assessment and Plan:   1. Acute hypoxemic respiratory failure due to COVID-19:   --Admit under inpatient status  --Unvaccinated status  --Symptoms came on somewhere around 12/5  --Positive test reportedly approximately 12/7/2021  --Start Decadron 6 mg daily for 10 days, did receive 3 days of 30 mg prednisone prior to presentation  --Recheck LFTs in the morning, revisit remdesivir at that point.  I discussed this medication and she is somewhat hesitant.  --Lovenox 40 mg daily for DVT prophylaxis  --Trend CRP and D-dimer  --Symptomatic cares including Tylenol, Robitussin, naproxen, Tessalon etc.  --Pepcid for GI prophylaxis while on steroids  --Update: CT PE protocol negative for PE  but shows moderate bilateral infiltrates as expected.        DVT Prophylaxis: Enoxaparin (Lovenox) SQ  Code Status: Full Code  Discharge Dispo: Admit to inpatient status        History of Present Illness:  Polly Whelan is a 56 year old female who is generally healthy but not vaccinated against COVID-19 who presents with shortness of breath in the setting of recent diagnosis of COVID-19.  She was diagnosed about 10 days ago and initially she had typical symptoms including fever, chills, cough and some loose stools.  Most of her symptoms were getting better aside from her dyspnea and cough and now even walking across the room leaves her quite short of breath.  She has had generally poor appetite but has been able to drink fluids.    Notably, she has been on prednisone 30 mg daily for the past 3 days in addition to azithromycin which was started as an outpatient.    She is a non-smoker and denies any history of underlying lung disease.           Past Medical History:  Denies pertinent medical history    Past Surgical History:  Past Surgical History:   Procedure Laterality Date     HYSTERECTOMY       THYROIDECTOMY      HALF of the thyroid     TONSILLECTOMY       Social History:  Social History     Tobacco Use     Smoking status: Not on file     Smokeless tobacco: Not on file   Substance Use Topics     Alcohol use: Not on file     Social History     Social History Narrative     Not on file     Family History:  Reviewed and noncontributory    Allergies:  Allergies   Allergen Reactions     Penicillins      Sulfa Drugs      Medications:  Prednisone 30 mg, azithromycin.    Review of Systems:  A Comprehensive greater than 10 system review of systems was carried out.  Pertinent positives and negatives are noted above.  Otherwise negative for contributory information.     Physical Exam:  Blood pressure (!) 125/96, pulse 79, temperature 98.2  F (36.8  C), temperature source Temporal, resp. rate 18, weight 56.5 kg (124 lb 9 oz),  SpO2 96 %.  Wt Readings from Last 1 Encounters:   12/17/21 56.5 kg (124 lb 9 oz)     Exam:  General: Alert, awake, no acute distress.  HEENT: NC/AT, eyes anicteric, external occular movements intact, face symmetric.   Cardiac: RRR, S1, S2.  No murmurs appreciated.  Pulmonary: Normal chest rise, normal work of breathing.  Lungs CTA BL  Abdomen: soft, non-tender, non-distended.  Bowel Sounds Present.  No guarding.  Extremities: no deformities.  Warm, well perfused.  Skin: no rashes or lesions noted.  Warm and Dry.  Neuro: No focal deficits noted.  Speech clear.  Coordination and strength grossly normal.  Psych: Appropriate affect.    Data:  EKG: Sinus rhythm  Imaging:  Results for orders placed or performed during the hospital encounter of 12/17/21   CT Chest Pulmonary Embolism w Contrast    Narrative    EXAM: CT CHEST PULMONARY EMBOLISM W CONTRAST  LOCATION: [facility]  DATE/TIME: 12/17/2021 8:11 PM    INDICATION: PE suspected, low/intermediate prob, positive d-dimer, shortness of breath, fever, chills, cough, COVID positive  COMPARISON: 03/22/2004  TECHNIQUE: CT chest pulmonary angiogram during arterial phase injection of IV contrast. Multiplanar reformats and MIP reconstructions were performed. Dose reduction techniques were used.   CONTRAST: 53 mL [id number]    FINDINGS:  ANGIOGRAM CHEST: Pulmonary arteries are normal caliber and negative for pulmonary emboli. Thoracic aorta is negative for dissection. No CT evidence of right heart strain.    LUNGS AND PLEURA: At least moderate areas of developing consolidation and groundglass opacity throughout both hemithoraces with a basilar predominance. No effusion.    MEDIASTINUM/AXILLAE: Normal.    CORONARY ARTERY CALCIFICATION: None.    UPPER ABDOMEN: 6.0 x 5.7 cm probable left adrenal gland adenoma considerably increased in size when compared to prior.    MUSCULOSKELETAL: Degenerative disease.      Impression    IMPRESSION:  1.  At least moderate  areas of groundglass opacity and developing consolidation consistent with patient's known COVID pneumonia. Follow-up recommended in 3 months to ensure resolution and exclude other underlying pathology.  2.  Increase in size of patient's presumed left adrenal gland adenoma.  3.  No pulmonary embolus, aortic aneurysm, or dissection.    Commonly reported imaging features of (COVID-19) pneumonia are present. Influenza pneumonia and organizing pneumonia as can be seen in the setting of drug toxicity and connective tissue disease can cause a similar imaging pattern.     Labs:  Recent Labs   Lab 12/17/21  1700   WBC 6.1   HGB 14.6   HCT 44.3   MCV 96             Lab Results   Component Value Date     12/17/2021     08/15/2010    Lab Results   Component Value Date    CHLORIDE 94 12/17/2021    CHLORIDE 103 08/15/2010    Lab Results   Component Value Date    BUN 15 12/17/2021    BUN 11 08/15/2010      Lab Results   Component Value Date    POTASSIUM 3.5 12/17/2021    POTASSIUM 3.9 08/15/2010    Lab Results   Component Value Date    CO2 28 12/17/2021    CO2 24 08/15/2010    Lab Results   Component Value Date    CR 0.54 12/17/2021    CR 0.62 08/15/2010        Recent Labs   Lab 12/17/21  1700   CRP 66.3*     Recent Labs   Lab 12/17/21  1700   AST 47*   *   ALKPHOS 68   BILITOTAL 0.6     No results for input(s): INR in the last 168 hours.  No results for input(s): LACT in the last 168 hours.      Mati Wilkins MD  Hospitalist  [facility]

## 2021-12-19 LAB
CRP SERPL-MCNC: 22.4 MG/L (ref 0–8)
D DIMER PPP FEU-MCNC: 0.31 UG/ML FEU (ref 0–0.5)
HOLD SPECIMEN: NORMAL

## 2021-12-19 PROCEDURE — 85379 FIBRIN DEGRADATION QUANT: CPT | Performed by: INTERNAL MEDICINE

## 2021-12-19 PROCEDURE — 36415 COLL VENOUS BLD VENIPUNCTURE: CPT | Performed by: INTERNAL MEDICINE

## 2021-12-19 PROCEDURE — 99233 SBSQ HOSP IP/OBS HIGH 50: CPT | Performed by: HOSPITALIST

## 2021-12-19 PROCEDURE — 86140 C-REACTIVE PROTEIN: CPT | Performed by: INTERNAL MEDICINE

## 2021-12-19 PROCEDURE — 120N000001 HC R&B MED SURG/OB

## 2021-12-19 PROCEDURE — 250N000013 HC RX MED GY IP 250 OP 250 PS 637: Performed by: INTERNAL MEDICINE

## 2021-12-19 PROCEDURE — 250N000011 HC RX IP 250 OP 636: Performed by: INTERNAL MEDICINE

## 2021-12-19 PROCEDURE — 250N000012 HC RX MED GY IP 250 OP 636 PS 637: Performed by: INTERNAL MEDICINE

## 2021-12-19 RX ADMIN — GUAIFENESIN AND DEXTROMETHORPHAN 10 ML: 100; 10 SYRUP ORAL at 08:24

## 2021-12-19 RX ADMIN — FAMOTIDINE 20 MG: 20 TABLET ORAL at 19:37

## 2021-12-19 RX ADMIN — GUAIFENESIN AND DEXTROMETHORPHAN 10 ML: 100; 10 SYRUP ORAL at 22:48

## 2021-12-19 RX ADMIN — ENOXAPARIN SODIUM 40 MG: 40 INJECTION SUBCUTANEOUS at 22:49

## 2021-12-19 RX ADMIN — FAMOTIDINE 20 MG: 20 TABLET ORAL at 08:24

## 2021-12-19 RX ADMIN — GUAIFENESIN AND DEXTROMETHORPHAN 10 ML: 100; 10 SYRUP ORAL at 17:04

## 2021-12-19 RX ADMIN — Medication 1 MG: at 22:48

## 2021-12-19 RX ADMIN — DEXAMETHASONE 6 MG: 2 TABLET ORAL at 08:24

## 2021-12-19 ASSESSMENT — ACTIVITIES OF DAILY LIVING (ADL)
ADLS_ACUITY_SCORE: 4

## 2021-12-19 NOTE — PLAN OF CARE
Pt A&O x4. VS stable; afebrile. Lung sounds: diminished-crackles in the bases. Robitussin given for productive cough. Denies pain. CMS intact. Up independently in room. Voiding in good amts. Tolerating regular diet. Plan is for possible discharge to home tomorrow. Will continue to monitor.

## 2021-12-19 NOTE — PROGRESS NOTES
[facility]    Hospitalist Progress Note    Date of Service (when I saw the patient): 12/19/2021  Provider:  Agustín Colin MD   Text Page  7am - 6PM       Assessment & Plan   Polly Whelan is a 56 year old female who is generally healthy but not vaccinated against COVID-19 who presents with shortness of breath in the setting of recent diagnosis of COVID-19.  She was diagnosed about 10 days ago and initially she had typical symptoms including fever, chills, cough and some loose stools.  Most of her symptoms were getting better aside from her dyspnea and cough and now even walking across the room leaves her quite short of breath.  She has had generally poor appetite but has been able to drink fluids. She had been on prednisone 30 mg daily for the 3 days preceding admission, in addition to azithromycin started as an outpatient.   In  the emergency department she was mildly hypoxemic requiring 2 L supplemental oxygen. CT PE protocol negative for PE, shows bilateral GGO / infiltrates.     Assessment and Plan:   1. Acute hypoxemic respiratory failure due to COVID-19:   --inpatient status  --Unvaccinated status  --Symptoms came on somewhere around 12/5  --Positive test reportedly approximately 12/7/2021  --Decadron 6 mg daily 2/10 days  -- LFTs allow to use remdesivir but she has declined.  --Lovenox 40 mg daily for DVT prophylaxis  -- CRP and D-dimer  --Tylenol, Robitussin, naproxen, Tessalon etc.  --Pepcid for GI prophylaxis while on steroids  2. Diarrhea 2/2 to Covid.   -- Imodium for diarrhea  3. Malnutrition Diagnosis: Moderate malnutrition  In Context of:  Acute illness or injury     DVT Prophylaxis: Enoxaparin (Lovenox) SQ  Code Status: Full Code    Disposition: Expected discharge in  1-  2 days w/o O2 if weaned off or low O2 necessity. .    Interval History   Last diarrhea yesterday afternoon, no fever, nausea or vomiting.  Her appetite is improving. Dyspnea a nd dest on exertion, quick  recovery with low flow. Desat when talking.  Overall better, with downward trend of acute phase reactants.    -Data reviewed today: I reviewed all new labs and imaging results over the last 24 hours.    Physical Exam   Temp: 97.2  F (36.2  C) Temp src: Temporal BP: 114/70 Pulse: 50   Resp: 18 SpO2: 91 % O2 Device: Nasal cannula Oxygen Delivery: 1 LPM  Vitals:    12/17/21 1339 12/17/21 2303   Weight: 56.5 kg (124 lb 9 oz) 56.5 kg (124 lb 9.6 oz)     Vital Signs with Ranges  Temp:  [97.2  F (36.2  C)-98.7  F (37.1  C)] 97.2  F (36.2  C)  Pulse:  [50-65] 50  Resp:  [16-20] 18  BP: (114-151)/(70-84) 114/70  SpO2:  [91 %-93 %] 91 %  I/O last 3 completed shifts:  In: 506 [P.O.:500; I.V.:6]  Out: -     GEN:  Alert, oriented x 3, appears comfortable, NAD.  HEENT:  Normocephalic/atraumatic, no scleral icterus, no nasal discharge, mouth moist.  CV:  Regular rate and rhythm, no murmur or JVD.  S1 + S2 noted, no S3 or S4.  LUNGS:  Clear to auscultation bilaterally without rales/rhonchi/wheezing/retractions.  Symmetric chest rise on inhalation noted.  ABD:  Active bowel sounds, soft, non-tender/non-distended.  No rebound/guarding/rigidity.  EXT:  No edema or cyanosis.  No joint synovitis noted.  SKIN:  Dry to touch, no exanthems noted in the visualized areas.       Medications       dexamethasone  6 mg Oral Daily     enoxaparin ANTICOAGULANT  40 mg Subcutaneous Q24H     famotidine  20 mg Oral BID     sodium chloride (PF)  3 mL Intracatheter Q8H       Data   Recent Labs   Lab 12/18/21  0752 12/17/21  1700   WBC  --  6.1   HGB  --  14.6   MCV  --  96   PLT  --  334    133   POTASSIUM 3.7 3.5   CHLORIDE 97 94   CO2 27 28   BUN 18 15   CR 0.59 0.54   ANIONGAP 9 11   JAIRO 8.9 9.0   * 123*   ALBUMIN 3.0* 3.2*   PROTTOTAL 7.3 8.0   BILITOTAL 0.6 0.6   ALKPHOS 67 68   * 121*   AST 35 47*       No results found for this or any previous visit (from the past 24 hour(s)).    Disclaimer: This note consists of symbols  derived from keyboarding, dictation and/or voice recognition software. As a result, there may be errors in the script that have gone undetected. Please consider this when interpreting information found in this chart.

## 2021-12-19 NOTE — PLAN OF CARE
"A/Ox4 and independent in room. Regular diet. Denies pain. VSS on 1L via NC. BP (!) 144/83 (BP Location: Left arm)   Pulse 50   Temp 98  F (36.7  C) (Temporal)   Resp 20   Ht 1.575 m (5' 2\")   Wt 56.5 kg (124 lb 9.6 oz)   SpO2 92%   BMI 22.79 kg/m   Receiving decadron and lovenox, declining remdesivir at this time. Plan to continue to wean O2 as able and continue meds. Pt will discharge to home. Will continue with plan of care.  "

## 2021-12-20 ENCOUNTER — DOCUMENTATION ONLY (OUTPATIENT)
Dept: HOME HEALTH SERVICES | Facility: CLINIC | Age: 56
End: 2021-12-20
Payer: COMMERCIAL

## 2021-12-20 VITALS
TEMPERATURE: 97.6 F | HEART RATE: 57 BPM | SYSTOLIC BLOOD PRESSURE: 113 MMHG | BODY MASS INDEX: 22.93 KG/M2 | DIASTOLIC BLOOD PRESSURE: 68 MMHG | WEIGHT: 124.6 LBS | HEIGHT: 62 IN | OXYGEN SATURATION: 91 % | RESPIRATION RATE: 22 BRPM

## 2021-12-20 LAB
ATRIAL RATE - MUSE: 68 BPM
CREAT SERPL-MCNC: 0.61 MG/DL (ref 0.52–1.04)
CRP SERPL-MCNC: 13.8 MG/L (ref 0–8)
D DIMER PPP FEU-MCNC: 0.34 UG/ML FEU (ref 0–0.5)
DIASTOLIC BLOOD PRESSURE - MUSE: NORMAL MMHG
GFR SERPL CREATININE-BSD FRML MDRD: >90 ML/MIN/1.73M2
INTERPRETATION ECG - MUSE: NORMAL
P AXIS - MUSE: 33 DEGREES
PLATELET # BLD AUTO: 323 10E3/UL (ref 150–450)
PR INTERVAL - MUSE: 144 MS
QRS DURATION - MUSE: 80 MS
QT - MUSE: 420 MS
QTC - MUSE: 446 MS
R AXIS - MUSE: 2 DEGREES
SYSTOLIC BLOOD PRESSURE - MUSE: NORMAL MMHG
T AXIS - MUSE: 22 DEGREES
VENTRICULAR RATE- MUSE: 68 BPM

## 2021-12-20 PROCEDURE — 250N000013 HC RX MED GY IP 250 OP 250 PS 637: Performed by: INTERNAL MEDICINE

## 2021-12-20 PROCEDURE — 99239 HOSP IP/OBS DSCHRG MGMT >30: CPT | Performed by: INTERNAL MEDICINE

## 2021-12-20 PROCEDURE — 85379 FIBRIN DEGRADATION QUANT: CPT | Performed by: INTERNAL MEDICINE

## 2021-12-20 PROCEDURE — 250N000012 HC RX MED GY IP 250 OP 636 PS 637: Performed by: INTERNAL MEDICINE

## 2021-12-20 PROCEDURE — 85049 AUTOMATED PLATELET COUNT: CPT | Performed by: INTERNAL MEDICINE

## 2021-12-20 PROCEDURE — 36415 COLL VENOUS BLD VENIPUNCTURE: CPT | Performed by: INTERNAL MEDICINE

## 2021-12-20 PROCEDURE — 82565 ASSAY OF CREATININE: CPT | Performed by: INTERNAL MEDICINE

## 2021-12-20 PROCEDURE — 86140 C-REACTIVE PROTEIN: CPT | Performed by: INTERNAL MEDICINE

## 2021-12-20 RX ORDER — DEXAMETHASONE 6 MG/1
6 TABLET ORAL DAILY
Qty: 6 TABLET | Refills: 0 | Status: SHIPPED | OUTPATIENT
Start: 2021-12-21

## 2021-12-20 RX ADMIN — GUAIFENESIN AND DEXTROMETHORPHAN 10 ML: 100; 10 SYRUP ORAL at 05:43

## 2021-12-20 RX ADMIN — FAMOTIDINE 20 MG: 20 TABLET ORAL at 08:18

## 2021-12-20 RX ADMIN — DEXAMETHASONE 6 MG: 2 TABLET ORAL at 08:18

## 2021-12-20 ASSESSMENT — ACTIVITIES OF DAILY LIVING (ADL)
ADLS_ACUITY_SCORE: 4

## 2021-12-20 NOTE — DISCHARGE SUMMARY
[facility]  Discharge Summary  Name: Polly Whelan    MRN: [id number]  YOB: 1965    Age: 56 year old  Date of Discharge:  12/20/2021  Date of Admission: 12/17/2021  Primary Care Provider: [facility]  Discharge Physician:  Jo-Ann Sabillon MD  Discharging Service:  Hospitalist      Discharge Diagnoses:  1.  Acute hypoxic respiratory failure secondary to COVID-19 viral pneumonia  2.  Transaminitis secondary to COVID-19     Follow-ups Needed After Discharge   Follow-up with PCP within 1 week    Unresulted Labs Ordered in the Past 30 Days of this Admission     No orders found from 10/16/2018 to 12/16/2018.        Hospital Course:  Polly Whelan is a 56 year old female who is generally healthy but not vaccinated against COVID-19 who was admitted on 12/17/2021 with shortness of breath in the setting of recent diagnosis of COVID-19.  She was diagnosed about 10 days ago and initially she had typical symptoms including fever, chills, cough and some loose stools.  Most of her symptoms were getting better aside from her dyspnea and cough and now even walking across the room leaves her quite short of breath.  She has had generally poor appetite but has been able to drink fluids.     Notably, she has been on prednisone 30 mg daily for the past 3 days in addition to azithromycin which was started as an outpatient.     Here in the emergency room she was mildly hypoxic requiring 2 L supplemental oxygen.  Lab work-up was relatively unremarkable including normal BMP but LFTs were mildly abnormal with ALT of 121 and AST of 47.  D-dimer was elevated at 0.71 and this was followed by CT PE protocol which is pending but does show bilateral infiltrates.    Patient was treated with Remdesivir and Decadron.  She improved throughout her hospital stay.  By the day of discharge her symptoms were improving but she still required supplemental oxygen.  She will be on 1 L at rest and 2 L with exertion.  Her  "D-dimer has normalized and CRP is improving.  As her D-dimer was only slightly elevated and she is ambulatory I do not think that she requires further prophylaxis upon discharge.  She will complete a 1210-day course of Decadron at the time of her discharge.  I did discuss with her that she should be vaccinated against COVID-19 once she has recovered from her acute illness.  All of her questions were answered.      Discharge Disposition:  Discharged to home     Allergies:  Allergies   Allergen Reactions     Penicillins      Sulfa Drugs         Condition on Discharge:  Discharge condition: Stable   Discharge vitals: Blood pressure 113/68, pulse 57, temperature 97.6  F (36.4  C), temperature source Temporal, resp. rate 22, height 1.575 m (5' 2\"), weight 56.5 kg (124 lb 9.6 oz), SpO2 91 %.   Code status on discharge: Full Code     History of Illness:  See detailed admission note for full details.    Physical Exam:  Blood pressure 113/68, pulse 57, temperature 97.6  F (36.4  C), temperature source Temporal, resp. rate 22, height 1.575 m (5' 2\"), weight 56.5 kg (124 lb 9.6 oz), SpO2 91 %.  Wt Readings from Last 1 Encounters:   12/17/21 56.5 kg (124 lb 9.6 oz)     General: Alert, awake, no acute distress.  HEENT: Normocephalic, atraumatic, eyes anicteric and without scleral injection, EOMI, MMM.  Cardiac: RRR, normal S1, S2.  No m/g/r. No LE edema.  Pulmonary: Normal chest rise, normal work of breathing.  Lungs CTAB without crackles or wheezing.  Abdomen: soft, non-tender, non-distended.  Normoactive BS.  No guarding or rebound tenderness.  Extremities: no deformities.  Warm, well perfused.  Skin: no rashes or lesions noted.  Warm and Dry.  Neuro: No focal deficits noted.  Speech clear.  Coordination and strength grossly normal.  Psych: Appropriate affect.  Alert and oriented x3    Procedures other than Imaging:  Supplemental oxygen   phlebotomy     Imaging:  Results for orders placed or performed during the hospital " encounter of 12/17/21   CT Chest Pulmonary Embolism w Contrast    Narrative    EXAM: CT CHEST PULMONARY EMBOLISM W CONTRAST  LOCATION: [facility]  DATE/TIME: 12/17/2021 8:11 PM    INDICATION: PE suspected, low/intermediate prob, positive d-dimer, shortness of breath, fever, chills, cough, COVID positive  COMPARISON: 03/22/2004  TECHNIQUE: CT chest pulmonary angiogram during arterial phase injection of IV contrast. Multiplanar reformats and MIP reconstructions were performed. Dose reduction techniques were used.   CONTRAST: 53 mL Isovue-370    FINDINGS:  ANGIOGRAM CHEST: Pulmonary arteries are normal caliber and negative for pulmonary emboli. Thoracic aorta is negative for dissection. No CT evidence of right heart strain.    LUNGS AND PLEURA: At least moderate areas of developing consolidation and groundglass opacity throughout both hemithoraces with a basilar predominance. No effusion.    MEDIASTINUM/AXILLAE: Normal.    CORONARY ARTERY CALCIFICATION: None.    UPPER ABDOMEN: 6.0 x 5.7 cm probable left adrenal gland adenoma considerably increased in size when compared to prior.    MUSCULOSKELETAL: Degenerative disease.      Impression    IMPRESSION:  1.  At least moderate areas of groundglass opacity and developing consolidation consistent with patient's known COVID pneumonia. Follow-up recommended in 3 months to ensure resolution and exclude other underlying pathology.  2.  Increase in size of patient's presumed left adrenal gland adenoma.  3.  No pulmonary embolus, aortic aneurysm, or dissection.    Commonly reported imaging features of (COVID-19) pneumonia are present. Influenza pneumonia and organizing pneumonia as can be seen in the setting of drug toxicity and connective tissue disease can cause a similar imaging pattern.        Consultations:  Consultations This Hospital Stay   [facility]TR[facility] ADULT IP CONSULT     Recent Lab Results:  Recent Labs   Lab 12/20/21  0629 12/17/21  1700    WBC  --  6.1   HGB  --  14.6   HCT  --  44.3   MCV  --  96    334     Recent Labs   Lab 12/20/21  0629 12/18/21  0752 12/17/21  1700   NA  --  133 133   POTASSIUM  --  3.7 3.5   CHLORIDE  --  97 94   CO2  --  27 28   ANIONGAP  --  9 11   GLC  --  183* 123*   BUN  --  18 15   CR 0.61 0.59 0.54   GFRESTIMATED >90 >90 >90   JAIRO  --  8.9 9.0   PROTTOTAL  --  7.3 8.0   ALBUMIN  --  3.0* 3.2*   BILITOTAL  --  0.6 0.6   ALKPHOS  --  67 68   AST  --  35 47*   ALT  --  107* 121*     Recent Labs   Lab 12/20/21  0629   DD 0.34     Recent Labs   Lab 12/20/21  0629 12/19/21  0742 12/18/21  0752   CRP 13.8* 22.4* 49.5*     No results for input(s): TROPONIN, TROPI, TROPR in the last 168 hours.    Invalid input(s): TROP, TROPONINIES, TNIH       Pending Results:    Unresulted Labs Ordered in the Past 30 Days of this Admission     No orders found from 11/17/2021 to 12/18/2021.           Discharge Instructions and Follow-Up:   Discharge Orders      COVID-19 GetWell Loop Referral      Reason for your hospital stay    You were hospitalized for COVID-19 viral pneumonia.  You are improving but still requiring a small amount of oxygen.  You will complete 6 more days of Decadron at the time of your discharge.     Contact provider    Contact your primary care provider if If increased trouble breathing, new arm/leg swelling, dizziness/passing out, falls, bleeding that doesn't stop, or uncontrolled pain.     Follow-up and recommended labs and tests     Follow up with primary care provider, [facility], within 7 days for hospital follow- up.  The following labs/tests are recommended: oxygen assessment.     Discharge - Quarantine/Isolation Instruction    Date of symptom onset:     Date of first positive test:    Stay home and away from others (self-isolate) for at least 20 days from when your symptoms started And...   You've had no fevers and no medicine that reduces fever for 1 full day (24 hours) And...   Your other  symptoms have resolved (gotten better).     Discharge - COVID Patient Oximeter Discharge Instructions    COVID Patient Oximeter Discharge Instructions     Use the oximeter to measure the amount of oxygen in your blood.  Put the clip on the tip of your pointer finger.  Turn on the device to measure your oxygen level.  Do this at least 2 times a day. Do it more than 2 times if you feel short of breath.      It should be 90% or higher while you're at rest.  If your oxygen level is 89% or lower, change the position of the clip on your finger or try another finger.  After 10 minutes if it's still 89% or lower, call 911/go to the Emergency Department.       If your shortness of breath may be getting worse but the oximeter still shows 90% or higher, call your doctor or clinic as soon as possible. If you can't reach your doctor or clinic, or if your shortness of breath gets very bad, call 911/go to the Emergency Department.    Note: Don't turn down your oxygen until you get instructions at your virtual visit.     Activity    Your activity upon discharge: activity as tolerated     Oxygen Adult/Peds    Oxygen Documentation:   I certify that this patient, Polly Whelan has been under my care (or a nurse practitioner or physican's assistant working with me). This is the face-to-face encounter for oxygen medical necessity.      Polly Whelan is now in a chronic stable state and continues to require supplemental oxygen. Patient has continued oxygen desaturation due to COVID.    Alternative treatment(s) tried or considered and deemed clinically infective for treatment of COVID include inhalers, steroids, and pulmonary toileting.  If portability is ordered, is the patient mobile within the home? yes    **Patients who qualify for home O2 coverage under the CMS guidelines require ABG tests or O2 sat readings obtained closest to, but no earlier than 2 days prior to the discharge, as evidence of the need for home oxygen therapy.  Testing must be performed while patient is in the chronic stable state. See notes for O2 sats.**     Diet    Follow this diet upon discharge: Orders Placed This Encounter      Combination Diet Regular Diet Adult     Discharge Medications   Current Discharge Medication List      START taking these medications    Details   dexamethasone (DECADRON) 6 MG tablet Take 1 tablet (6 mg) by mouth daily  Qty: 6 tablet, Refills: 0    Associated Diagnoses: Pneumonia due to 2019 novel coronavirus         CONTINUE these medications which have NOT CHANGED    Details   albuterol (PROAIR HFA/PROVENTIL HFA/VENTOLIN HFA) 108 (90 Base) MCG/ACT inhaler Inhale 1-2 puffs into the lungs every 4 hours as needed         STOP taking these medications       azithromycin (ZITHROMAX) 250 MG tablet Comments:   Reason for Stopping:         predniSONE (DELTASONE) 10 MG tablet Comments:   Reason for Stopping:               Time Spent on this Encounter   I, Jo-Ann Sabillon MD, personally saw the patient today and spent greater than 30 minutes discharging this patient.    Jo-Ann Sabillon MD

## 2021-12-20 NOTE — PLAN OF CARE
"A/Ox4 and independent in room. Regular diet. Denies pain. VSS on 1L via NC, patient bradycardic at times of rest. BP (!) 140/87 (BP Location: Left arm)   Pulse 57   Temp 97.7  F (36.5  C) (Temporal)   Resp 18   Ht 1.575 m (5' 2\")   Wt 56.5 kg (124 lb 9.6 oz)   SpO2 92%   BMI 22.79 kg/m   Receiving decadron and lovenox, declined remdesivir. Robitussin given x 2. Possible discharge today. Will continue with plan of care.  "

## 2021-12-20 NOTE — PROGRESS NOTES
Received intake call for home oxygen at 10:51AM. Reviewed patient's chart; Patient qualifies under insurance guidelines and all documentation is in the chart including a good order.   11:15AM- Called to offer choice and patient is okay with [facility] setting them up. Discussed equipment with patient and informed them that we would be to bedside with oxygen in the next 2 hours.    11:19AM- Spoke with care coordinator, AMILCAR, confirmed we received the order, and provided them with ETA of oxygen.

## 2021-12-20 NOTE — PLAN OF CARE
Pt A&Ox4. Up independently in room. Home O2 note placed. VSS afebrile RA but at times needs 1-2L NC with prolonged activity and sleeping. LS diminished with fine crackles in the BLL. Infrequent non productive cough. Denies pain. CMS intact. Voiding adequate amounts. Tolerated regular diet. Plan is Discharge later today with home O2 and pulse oximeter. All belongings taken with pt. Discharge instructions given and questions answered. Form signed and on chart. Portable home O2 tank and pulse Ox instructions given and pt took them home.

## 2021-12-20 NOTE — DISCHARGE INSTRUCTIONS
Oxygen Provider:  Arranged through [facility], contact number [phone].  If you have any questions or concerns please call the oxygen company directly.

## 2021-12-20 NOTE — PROGRESS NOTES
Patient has been assessed for Home Oxygen needs. Oxygen readings:    *Pulse oximetry (SpO2) = 90% on room air at rest while awake.    *SpO2 improved to 96  % on 1liters/minute at rest.    *SpO2 = 85% on room air during activity/with exercise.    *SpO2 improved to % on 2liters/minute during activity/with exercise.

## 2021-12-20 NOTE — PROGRESS NOTES
Oxygen Documentation:   I certify that this patient, Polly Whelan has been under my care (or a nurse practitioner or physican's assistant working with me). This is the face-to-face encounter for oxygen medical necessity.      Polly Whelan is now in a chronic stable state and continues to require supplemental oxygen. Patient has continued oxygen desaturation due to COVID.    Alternative treatment(s) tried or considered and deemed clinically infective for treatment of COVID include inhalers, steroids, and pulmonary toileting.  If portability is ordered, is the patient mobile within the home? yes    **Patients who qualify for home O2 coverage under the CMS guidelines require ABG tests or O2 sat readings obtained closest to, but no earlier than 2 days prior to the discharge, as evidence of the need for home oxygen therapy. Testing must be performed while patient is in the chronic stable state. See notes for O2 sats.**    Jo-Ann Sabillon MD

## 2021-12-20 NOTE — CONSULTS
Care Management Discharge Note    Discharge Date: 12/21/2021       Discharge Disposition: Home,DME    Discharge Services: None    Discharge DME: Oxygen    Discharge Transportation:      Private pay costs discussed: Not applicable    Patient/Family in Agreement with the Plan: yes    Handoff Referral Completed: No    Additional Information:  CM following for new home oxygen needs. Provider has placed orders for home oxygen. Anticipate face to face documentation completed with discharge summary. Patient's SpO2 readings at rest and with activity have been completed and documented by staff within the past 48 hours. Called [facility] [phone] to make them aware of new orders. They will call back if any issues with orders or documentation. [facility] will follow-up with patient to discuss process, offer choice and update AVS. Updated bedside RN.    Update 1115: Received call from [facility] verifying correct documentation and confirming delivery of oxygen to the hospital this afternoon.       Bre Beebe, RN      Bre Beebe RN Case Manager  Inpatient Care Coordination  [facility]   [phone]